# Patient Record
Sex: FEMALE | URBAN - METROPOLITAN AREA
[De-identification: names, ages, dates, MRNs, and addresses within clinical notes are randomized per-mention and may not be internally consistent; named-entity substitution may affect disease eponyms.]

---

## 2021-05-29 ENCOUNTER — RECORDS - HEALTHEAST (OUTPATIENT)
Dept: ADMINISTRATIVE | Facility: CLINIC | Age: 41
End: 2021-05-29

## 2021-05-30 ENCOUNTER — RECORDS - HEALTHEAST (OUTPATIENT)
Dept: ADMINISTRATIVE | Facility: CLINIC | Age: 41
End: 2021-05-30

## 2025-07-04 ENCOUNTER — HOSPITAL ENCOUNTER (EMERGENCY)
Facility: HOSPITAL | Age: 45
Discharge: HOME | End: 2025-07-04
Attending: EMERGENCY MEDICINE | Admitting: EMERGENCY MEDICINE

## 2025-07-04 ENCOUNTER — APPOINTMENT (OUTPATIENT)
Dept: CT IMAGING | Facility: HOSPITAL | Age: 45
End: 2025-07-04
Attending: EMERGENCY MEDICINE

## 2025-07-04 VITALS
RESPIRATION RATE: 15 BRPM | TEMPERATURE: 98.3 F | SYSTOLIC BLOOD PRESSURE: 96 MMHG | DIASTOLIC BLOOD PRESSURE: 58 MMHG | OXYGEN SATURATION: 98 % | HEART RATE: 88 BPM | WEIGHT: 118 LBS

## 2025-07-04 DIAGNOSIS — F10.920 ACUTE ALCOHOLIC INTOXICATION WITHOUT COMPLICATION: ICD-10-CM

## 2025-07-04 LAB
ALBUMIN SERPL BCG-MCNC: 4.7 G/DL (ref 3.5–5.2)
ALBUMIN UR-MCNC: NEGATIVE MG/DL
ALP SERPL-CCNC: 92 U/L (ref 40–150)
ALT SERPL W P-5'-P-CCNC: 27 U/L (ref 0–50)
ANION GAP SERPL CALCULATED.3IONS-SCNC: 14 MMOL/L (ref 7–15)
APPEARANCE UR: CLEAR
AST SERPL W P-5'-P-CCNC: 21 U/L (ref 0–45)
BACTERIA #/AREA URNS HPF: ABNORMAL /HPF
BASOPHILS # BLD AUTO: 0 10E3/UL (ref 0–0.2)
BASOPHILS NFR BLD AUTO: 1 %
BILIRUB DIRECT SERPL-MCNC: <0.08 MG/DL (ref 0–0.3)
BILIRUB SERPL-MCNC: 0.3 MG/DL
BILIRUB UR QL STRIP: NEGATIVE
BUN SERPL-MCNC: 4.9 MG/DL (ref 6–20)
CALCIUM SERPL-MCNC: 9.2 MG/DL (ref 8.8–10.4)
CHLORIDE SERPL-SCNC: 109 MMOL/L (ref 98–107)
COLOR UR AUTO: COLORLESS
CREAT SERPL-MCNC: 0.51 MG/DL (ref 0.51–0.95)
EGFRCR SERPLBLD CKD-EPI 2021: >90 ML/MIN/1.73M2
EOSINOPHIL # BLD AUTO: 0.3 10E3/UL (ref 0–0.7)
EOSINOPHIL NFR BLD AUTO: 4 %
ERYTHROCYTE [DISTWIDTH] IN BLOOD BY AUTOMATED COUNT: 12.4 % (ref 10–15)
ETHANOL SERPL-MCNC: 0.26 G/DL
GLUCOSE SERPL-MCNC: 118 MG/DL (ref 70–99)
GLUCOSE UR STRIP-MCNC: NEGATIVE MG/DL
HCG SERPL QL: NEGATIVE
HCO3 SERPL-SCNC: 22 MMOL/L (ref 22–29)
HCT VFR BLD AUTO: 40.5 % (ref 35–47)
HGB BLD-MCNC: 13.8 G/DL (ref 11.7–15.7)
HGB UR QL STRIP: NEGATIVE
HOLD SPECIMEN: NORMAL
IMM GRANULOCYTES # BLD: 0 10E3/UL
IMM GRANULOCYTES NFR BLD: 1 %
KETONES UR STRIP-MCNC: NEGATIVE MG/DL
LEUKOCYTE ESTERASE UR QL STRIP: NEGATIVE
LIPASE SERPL-CCNC: 44 U/L (ref 13–60)
LYMPHOCYTES # BLD AUTO: 2.2 10E3/UL (ref 0.8–5.3)
LYMPHOCYTES NFR BLD AUTO: 29 %
MCH RBC QN AUTO: 32.2 PG (ref 26.5–33)
MCHC RBC AUTO-ENTMCNC: 34.1 G/DL (ref 31.5–36.5)
MCV RBC AUTO: 95 FL (ref 78–100)
MONOCYTES # BLD AUTO: 0.4 10E3/UL (ref 0–1.3)
MONOCYTES NFR BLD AUTO: 5 %
NEUTROPHILS # BLD AUTO: 4.6 10E3/UL (ref 1.6–8.3)
NEUTROPHILS NFR BLD AUTO: 61 %
NITRATE UR QL: NEGATIVE
NRBC # BLD AUTO: 0 10E3/UL
NRBC BLD AUTO-RTO: 0 /100
PH UR STRIP: 6.5 [PH] (ref 5–7)
PLATELET # BLD AUTO: 255 10E3/UL (ref 150–450)
POTASSIUM SERPL-SCNC: 3.5 MMOL/L (ref 3.4–5.3)
PROT SERPL-MCNC: 7.9 G/DL (ref 6.4–8.3)
RBC # BLD AUTO: 4.28 10E6/UL (ref 3.8–5.2)
RBC URINE: 0 /HPF
SODIUM SERPL-SCNC: 145 MMOL/L (ref 135–145)
SP GR UR STRIP: 1 (ref 1–1.03)
SQUAMOUS EPITHELIAL: <1 /HPF
UROBILINOGEN UR STRIP-MCNC: NORMAL MG/DL
WBC # BLD AUTO: 7.6 10E3/UL (ref 4–11)
WBC URINE: <1 /HPF

## 2025-07-04 PROCEDURE — 96360 HYDRATION IV INFUSION INIT: CPT | Mod: 59 | Performed by: EMERGENCY MEDICINE

## 2025-07-04 PROCEDURE — 96361 HYDRATE IV INFUSION ADD-ON: CPT | Performed by: EMERGENCY MEDICINE

## 2025-07-04 PROCEDURE — 93005 ELECTROCARDIOGRAM TRACING: CPT | Performed by: EMERGENCY MEDICINE

## 2025-07-04 PROCEDURE — 83690 ASSAY OF LIPASE: CPT | Performed by: EMERGENCY MEDICINE

## 2025-07-04 PROCEDURE — 84703 CHORIONIC GONADOTROPIN ASSAY: CPT | Performed by: EMERGENCY MEDICINE

## 2025-07-04 PROCEDURE — 82077 ASSAY SPEC XCP UR&BREATH IA: CPT | Performed by: EMERGENCY MEDICINE

## 2025-07-04 PROCEDURE — 82040 ASSAY OF SERUM ALBUMIN: CPT | Performed by: EMERGENCY MEDICINE

## 2025-07-04 PROCEDURE — 85025 COMPLETE CBC W/AUTO DIFF WBC: CPT | Performed by: EMERGENCY MEDICINE

## 2025-07-04 PROCEDURE — 74177 CT ABD & PELVIS W/CONTRAST: CPT

## 2025-07-04 PROCEDURE — 81003 URINALYSIS AUTO W/O SCOPE: CPT | Performed by: EMERGENCY MEDICINE

## 2025-07-04 PROCEDURE — 36415 COLL VENOUS BLD VENIPUNCTURE: CPT | Performed by: EMERGENCY MEDICINE

## 2025-07-04 PROCEDURE — 99285 EMERGENCY DEPT VISIT HI MDM: CPT | Mod: 25 | Performed by: EMERGENCY MEDICINE

## 2025-07-04 PROCEDURE — 250N000011 HC RX IP 250 OP 636: Performed by: EMERGENCY MEDICINE

## 2025-07-04 PROCEDURE — 80048 BASIC METABOLIC PNL TOTAL CA: CPT | Performed by: EMERGENCY MEDICINE

## 2025-07-04 PROCEDURE — 258N000003 HC RX IP 258 OP 636: Performed by: EMERGENCY MEDICINE

## 2025-07-04 RX ORDER — IOPAMIDOL 755 MG/ML
80 INJECTION, SOLUTION INTRAVASCULAR ONCE
Status: COMPLETED | OUTPATIENT
Start: 2025-07-04 | End: 2025-07-04

## 2025-07-04 RX ADMIN — SODIUM CHLORIDE 1000 ML: 0.9 INJECTION, SOLUTION INTRAVENOUS at 14:12

## 2025-07-04 RX ADMIN — SODIUM CHLORIDE 1000 ML: 0.9 INJECTION, SOLUTION INTRAVENOUS at 12:09

## 2025-07-04 RX ADMIN — IOPAMIDOL 80 ML: 755 INJECTION, SOLUTION INTRAVENOUS at 13:41

## 2025-07-04 ASSESSMENT — ACTIVITIES OF DAILY LIVING (ADL)
ADLS_ACUITY_SCORE: 41

## 2025-07-04 ASSESSMENT — COLUMBIA-SUICIDE SEVERITY RATING SCALE - C-SSRS
2. HAVE YOU ACTUALLY HAD ANY THOUGHTS OF KILLING YOURSELF IN THE PAST MONTH?: NO
6. HAVE YOU EVER DONE ANYTHING, STARTED TO DO ANYTHING, OR PREPARED TO DO ANYTHING TO END YOUR LIFE?: NO
1. IN THE PAST MONTH, HAVE YOU WISHED YOU WERE DEAD OR WISHED YOU COULD GO TO SLEEP AND NOT WAKE UP?: NO

## 2025-07-04 NOTE — ED NOTES
Bed: JNED-04  Expected date: 7/4/25  Expected time: 11:23 AM  Means of arrival: Ambulance  Comments:  Sarika - 45F ETOH intoxication, vss

## 2025-07-04 NOTE — ED PROVIDER NOTES
EMERGENCY DEPARTMENT ENCOUNTER      NAME: Kerry Whitehead  AGE: 45 year old female  YOB: 1980  MRN: [id number]  EVALUATION DATE & TIME: 7/4/2025 11:43 AM    PCP: No primary care provider on file.    ED PROVIDER: Jenna Sanchez M.D.      Chief Complaint   Patient presents with    Alcohol Intoxication         FINAL IMPRESSION:  1. Acute alcoholic intoxication without complication          ED COURSE & MEDICAL DECISION MAKING:    ED Course as of 07/04/25 1540   Fri Jul 04, 2025   1207 Pt with chronic RLQ pain BIB EMS after she drank EtOH overnight which is NOT typical for her (she does explain that she was celebrating fourth of july) with fatigue after sitting in the sun on her balcony. No florid encephalopathy and temperature normal here at 98.3F, examination with some RLQ pain which she notes is chronic with upcoming August 1st endoscopy and colonoscopy for the pain after prior imaging nromal. Hwoever, as  here with some ETOH use and likely dehdyration, CT pending with IVF. She denies nausea/vomiting, initially does not talk out loud and only mouths words but with some encouragement then starts talking very extensively, NIHSS 0 on my examination reassuringly at this point. Pt ok with plan, pending LFTs/lipase with UA and EtOH level, hcg.    1246 Patient now wth family at bedside and ambulating to and from restroom independently   1412 UA without cells to suggest UTI and EtOH 0.26 consistent with affect and HPI. LFTs, lipase WNL and CBC and chemistry WNL, hcg negative, awaiting CT read and will await sobriety   1421 CT without acute anomalies in abdomen, reassuringly. She does appear to have a degree of esophagitis but only reported RLQ tenderness which has been present for several years thus unlikely relating to today's presentation.    1502 Patient feeling overall improved, daughter of patient was present in the ED but not here at this moment. I tried calling daughter with patient's consent but  "no answer to listed number, she isn't sure where her daughter walked to but was here recently, vows no further EtOH abuse. Will await return of her daughter to discharge so she has a safe person to go hoem with and safe ride home while intoxicated   1509 Per RN Sandra who was here this morning, patient's daughter stepped away to run errands with plan to return   1510 I again attempted to call daughter Kelly Dunaway at [phone] without pickup of phone, message left   1514 Again call to daughter with no answer   1533 I again called listed number for daughter of patient and she did not , a brother said his older sister Kelly can actually be reached at [phone]. He says he can't come pick patient up himself at this time. I then called number for Kelly and it went straight to voicemail, so I called patient's son's number again and he didn't answer this second time. Thus I called Kelly's second number a second time to leave a voicemail. She then answered and said \"Oh I'm here!' And I looked over and see her walking back into the ER.I spoke with patient and her daughter and they are okwith plan to discharge home. Patient discharged after being provided with extensive anticipatory guidance and given return precautions, importance of PMD follow-up emphasized.        Pertinent Labs & Imaging studies reviewed. (See chart for details)    Medical Decision Making  I obtained history from Family Member/Significant Other and EMS  Discharge. No recommendations on prescription strength medication(s). I considered admission, but discharged patient after significant clinical improvement.    MIPS (CTPE, Dental pain, Pascual, Sinusitis, Asthma/COPD, Head Trauma): Not Applicable    SEPSIS: None        At the conclusion of the encounter I discussed the results of all of the tests and the disposition. The questions were answered. The patient or family acknowledged understanding and was agreeable with the care plan. " "    MEDICATIONS GIVEN IN THE EMERGENCY:  Medications   sodium chloride 0.9% BOLUS 1,000 mL (0 mLs Intravenous Stopped 7/4/25 1411)   iopamidol (ISOVUE-370) solution 80 mL (80 mLs Intravenous $Given 7/4/25 1341)   sodium chloride 0.9% BOLUS 1,000 mL (1,000 mLs Intravenous $New Bag 7/4/25 1412)       NEW PRESCRIPTIONS STARTED AT TODAY'S ER VISIT  New Prescriptions    No medications on file          =================================================================    HPI      Kerry Whitehead is a 45 year old female with no PMHx who presents to the ED today via EMS with Alcohol Intoxication       Per EMS  The patient's son called EMS today (7/4/2025) due to the patient being incoherent at home. While en route to the emergency department, the patient admitted to EMS that she drank alcohol today.     Per Patient   Patient presents to the emergency department today (7/4/2025) after asking her son to call EMS because she felt ill since last night when she drank alcohol before stepping onto her outdoor balcony, and undergoing heat exhaustion from the direct sunlight. She states that she is okay at this time. She states that she does not drink alcohol every day however, she drinks occasionally when returning home. She endorses abdominal pain that has been present for \"many years,\" and explains that she has an upcoming endoscopy and colonoscopy on 08/01/2025 (in a couple of weeks). Patient denies recent falls, injuries, hematuria, dysuria, and nausea.       REVIEW OF SYSTEMS   All other systems reviewed and are negative except as noted above in HPI.    PAST MEDICAL HISTORY:  No past medical history on file.    PAST SURGICAL HISTORY:  No past surgical history on file.    CURRENT MEDICATIONS:    No current outpatient medications on file.      ALLERGIES:  No Known Allergies    FAMILY HISTORY:  No family history on file.    SOCIAL HISTORY:   Social History     Socioeconomic History    Marital status: Single "       VITALS:  Patient Vitals for the past 24 hrs:   BP Temp Temp src Pulse Resp SpO2 Weight   07/04/25 1512 90/50 -- -- 91 14 98 % --   07/04/25 1351 (!) 88/52 -- -- 82 16 97 % --   07/04/25 1331 (!) 86/53 -- -- 83 18 95 % --   07/04/25 1315 91/54 -- -- 89 20 97 % 53.5 kg (118 lb)   07/04/25 1308 89/50 -- -- 88 19 98 % --   07/04/25 1232 93/54 -- -- 94 22 96 % --   07/04/25 1144 119/66 98.3  F (36.8  C) Oral 112 16 98 % --       PHYSICAL EXAM    GENERAL: Awake, alert.  In no acute distress.   HEENT: Normocephalic, atraumatic.  Pupils equal, round and reactive.  Conjunctiva normal.  EOMI.  NECK: No stridor or apparent deformity.  PULMONARY: Symmetrical breath sounds without distress.  Lungs clear to auscultation bilaterally without wheezes, rhonchi or rales.  CARDIO: Regular rate and rhythm.  No significant murmur, rub or gallop.  Radial pulses strong and symmetrical.  ABDOMINAL: Abdomen soft, non-distended and non-tender to palpation.  No CVAT, no palpable hepatosplenomegaly.  EXTREMITIES: No lower extremity swelling or edema.    NEURO: Alert and oriented to person, place and time.  Cranial nerves grossly intact.  No focal motor deficit.  PSYCH: Normal mood and affect  SKIN: No rashes      LAB:  All pertinent labs reviewed and interpreted.  Results for orders placed or performed during the hospital encounter of 07/04/25   CT Abdomen Pelvis w Contrast    Impression    IMPRESSION:   1.  There is mild, circumferential wall thickening of the distal esophagus without a hiatal hernia, adenopathy or mass.   2.  Findings suggest  an esophagitis.  3.  No other abnormalities are seen to explain pain.  4.  Patient needs to void.   Extra Blue Top Tube   Result Value Ref Range    Hold Specimen JIC    Extra Red Top Tube   Result Value Ref Range    Hold Specimen JIC    Extra Green Top (Lithium Heparin) Tube   Result Value Ref Range    Hold Specimen JIC    Extra Purple Top Tube   Result Value Ref Range    Hold Specimen JIC     Basic metabolic panel   Result Value Ref Range    Sodium 145 135 - 145 mmol/L    Potassium 3.5 3.4 - 5.3 mmol/L    Chloride 109 (H) 98 - 107 mmol/L    Carbon Dioxide (CO2) 22 22 - 29 mmol/L    Anion Gap 14 7 - 15 mmol/L    Urea Nitrogen 4.9 (L) 6.0 - 20.0 mg/dL    Creatinine 0.51 0.51 - 0.95 mg/dL    GFR Estimate >90 >60 mL/min/1.73m2    Calcium 9.2 8.8 - 10.4 mg/dL    Glucose 118 (H) 70 - 99 mg/dL   Hepatic function panel   Result Value Ref Range    Protein Total 7.9 6.4 - 8.3 g/dL    Albumin 4.7 3.5 - 5.2 g/dL    Bilirubin Total 0.3 <=1.2 mg/dL    Alkaline Phosphatase 92 40 - 150 U/L    AST 21 0 - 45 U/L    ALT 27 0 - 50 U/L    Bilirubin Direct <0.08 0.00 - 0.30 mg/dL   Result Value Ref Range    Lipase 44 13 - 60 U/L   HCG qualitative Blood   Result Value Ref Range    hCG Serum Qualitative Negative Negative   UA with Microscopic reflex to Culture    Specimen: Urine, Midstream   Result Value Ref Range    Color Urine Colorless Colorless, Straw, Light Yellow, Yellow    Appearance Urine Clear Clear    Glucose Urine Negative Negative mg/dL    Bilirubin Urine Negative Negative    Ketones Urine Negative Negative mg/dL    Specific Gravity Urine 1.005 1.001 - 1.030    Blood Urine Negative Negative    pH Urine 6.5 5.0 - 7.0    Protein Albumin Urine Negative Negative mg/dL    Urobilinogen Urine Normal Normal mg/dL    Nitrite Urine Negative Negative    Leukocyte Esterase Urine Negative Negative    Bacteria Urine Few (A) None Seen /HPF    RBC Urine 0 <=2 /HPF    WBC Urine <1 <=5 /HPF    Squamous Epithelials Urine <1 <=1 /HPF   Result Value Ref Range    Ethanol Level Blood 0.26 (H) <=0.01 g/dL   CBC with platelets and differential   Result Value Ref Range    WBC Count 7.6 4.0 - 11.0 10e3/uL    RBC Count 4.28 3.80 - 5.20 10e6/uL    Hemoglobin 13.8 11.7 - 15.7 g/dL    Hematocrit 40.5 35.0 - 47.0 %    MCV 95 78 - 100 fL    MCH 32.2 26.5 - 33.0 pg    MCHC 34.1 31.5 - 36.5 g/dL    RDW 12.4 10.0 - 15.0 %    Platelet Count 255 150 -  450 10e3/uL    % Neutrophils 61 %    % Lymphocytes 29 %    % Monocytes 5 %    % Eosinophils 4 %    % Basophils 1 %    % Immature Granulocytes 1 %    NRBCs per 100 WBC 0 <1 /100    Absolute Neutrophils 4.6 1.6 - 8.3 10e3/uL    Absolute Lymphocytes 2.2 0.8 - 5.3 10e3/uL    Absolute Monocytes 0.4 0.0 - 1.3 10e3/uL    Absolute Eosinophils 0.3 0.0 - 0.7 10e3/uL    Absolute Basophils 0.0 0.0 - 0.2 10e3/uL    Absolute Immature Granulocytes 0.0 <=0.4 10e3/uL    Absolute NRBCs 0.0 10e3/uL     Labs Ordered and Resulted from Time of ED Arrival to Time of ED Departure   BASIC METABOLIC PANEL - Abnormal       Result Value    Sodium 145      Potassium 3.5      Chloride 109 (*)     Carbon Dioxide (CO2) 22      Anion Gap 14      Urea Nitrogen 4.9 (*)     Creatinine 0.51      GFR Estimate >90      Calcium 9.2      Glucose 118 (*)    ROUTINE UA WITH MICROSCOPIC REFLEX TO CULTURE - Abnormal    Color Urine Colorless      Appearance Urine Clear      Glucose Urine Negative      Bilirubin Urine Negative      Ketones Urine Negative      Specific Gravity Urine 1.005      Blood Urine Negative      pH Urine 6.5      Protein Albumin Urine Negative      Urobilinogen Urine Normal      Nitrite Urine Negative      Leukocyte Esterase Urine Negative      Bacteria Urine Few (*)     RBC Urine 0      WBC Urine <1      Squamous Epithelials Urine <1     ETHANOL LEVEL BLOOD - Abnormal    Ethanol Level Blood 0.26 (*)    HEPATIC FUNCTION PANEL - Normal    Protein Total 7.9      Albumin 4.7      Bilirubin Total 0.3      Alkaline Phosphatase 92      AST 21      ALT 27      Bilirubin Direct <0.08     LIPASE - Normal    Lipase 44     HCG QUALITATIVE PREGNANCY - Normal    hCG Serum Qualitative Negative     CBC WITH PLATELETS AND DIFFERENTIAL    WBC Count 7.6      RBC Count 4.28      Hemoglobin 13.8      Hematocrit 40.5      MCV 95      MCH 32.2      MCHC 34.1      RDW 12.4      Platelet Count 255      % Neutrophils 61      % Lymphocytes 29      % Monocytes 5       % Eosinophils 4      % Basophils 1      % Immature Granulocytes 1      NRBCs per 100 WBC 0      Absolute Neutrophils 4.6      Absolute Lymphocytes 2.2      Absolute Monocytes 0.4      Absolute Eosinophils 0.3      Absolute Basophils 0.0      Absolute Immature Granulocytes 0.0      Absolute NRBCs 0.0          RADIOLOGY:  Reviewed all pertinent imaging. Please see official radiology dictation for full report.  CT Abdomen Pelvis w Contrast   Final Result   IMPRESSION:    1.  There is mild, circumferential wall thickening of the distal esophagus without a hiatal hernia, adenopathy or mass.    2.  Findings suggest  an esophagitis.   3.  No other abnormalities are seen to explain pain.   4.  Patient needs to void.         EKG:    Reviewed and interpreted as: 7/4/2025 1148  Rate: 103  sinus tachycardia. No previous ECG's available.       I have independently reviewed and interpreted the EKG(s) documented above.        I, Haleigh Petty,  am serving as a scribe to document services personally performed by Dr. Jenna Sanchez based on my observation and the provider's statements to me. I, Jenna Sanchez MD attest that Haleigh Petty is acting in a scribe capacity, has observed my performance of the services and has documented them in accordance with my direction.       Jenna Sanchez MD  07/04/25 [id number]

## 2025-07-04 NOTE — ED NOTES
Pt states usually drinks beer but has some liquor with friend last pm and concerned about not able to have BM for couple of days.  Pt states has appt with specialist for this as she has been having trouble with this for years.

## 2025-07-04 NOTE — ED NOTES
Amb to restroom without diff with family member. Denies dizziness or weakness.   Tolerated well and without incident.

## 2025-07-04 NOTE — ED NOTES
Daughter is present and at bedside. Discharge instructions and education given, patient with understanding and teachback. No questions at this time.

## 2025-07-04 NOTE — ED TRIAGE NOTES
Ems to rm4 from home.  Pt son called ems due to pt incoherent.  Pt has been drinking etoh all night per son.  Per medic pt told him she is feeling different than when she usually drinks. Since they arrived on scene she has not said anything else but will nod her head to answer questions.      Triage Assessment (Adult)       Row Name 07/04/25 1148          Triage Assessment    Airway WDL WDL        Respiratory WDL    Respiratory WDL WDL        Skin Circulation/Temperature WDL    Skin Circulation/Temperature WDL X;temperature     Skin Temperature warm        Cardiac WDL    Cardiac WDL X;all     Pulse Rate & Regularity tachycardic        Peripheral/Neurovascular WDL    Peripheral Neurovascular WDL WDL        Cognitive/Neuro/Behavioral WDL    Cognitive/Neuro/Behavioral WDL X;all        Pupils (CN II)    Pupil PERRLA yes     Pupil Size Left 2 mm     Pupil Size Right 2 mm